# Patient Record
Sex: FEMALE | Race: WHITE | NOT HISPANIC OR LATINO | Employment: OTHER | URBAN - METROPOLITAN AREA
[De-identification: names, ages, dates, MRNs, and addresses within clinical notes are randomized per-mention and may not be internally consistent; named-entity substitution may affect disease eponyms.]

---

## 2024-09-23 ENCOUNTER — OFFICE VISIT (OUTPATIENT)
Dept: GASTROENTEROLOGY | Facility: CLINIC | Age: 69
End: 2024-09-23
Payer: MEDICARE

## 2024-09-23 ENCOUNTER — LAB (OUTPATIENT)
Dept: LAB | Facility: LAB | Age: 69
End: 2024-09-23
Payer: MEDICARE

## 2024-09-23 VITALS
HEIGHT: 66 IN | TEMPERATURE: 97.4 F | WEIGHT: 190 LBS | DIASTOLIC BLOOD PRESSURE: 87 MMHG | HEART RATE: 76 BPM | SYSTOLIC BLOOD PRESSURE: 133 MMHG | BODY MASS INDEX: 30.53 KG/M2

## 2024-09-23 DIAGNOSIS — K21.9 GASTROESOPHAGEAL REFLUX DISEASE, UNSPECIFIED WHETHER ESOPHAGITIS PRESENT: ICD-10-CM

## 2024-09-23 DIAGNOSIS — Z11.59 NEED FOR HEPATITIS C SCREENING TEST: ICD-10-CM

## 2024-09-23 DIAGNOSIS — Z00.00 HEALTHCARE MAINTENANCE: Primary | ICD-10-CM

## 2024-09-23 DIAGNOSIS — R93.5 ABNORMAL CT OF THE ABDOMEN: ICD-10-CM

## 2024-09-23 DIAGNOSIS — K29.70 GASTRITIS WITHOUT BLEEDING, UNSPECIFIED CHRONICITY, UNSPECIFIED GASTRITIS TYPE: ICD-10-CM

## 2024-09-23 DIAGNOSIS — Z00.00 HEALTHCARE MAINTENANCE: ICD-10-CM

## 2024-09-23 DIAGNOSIS — Z12.11 COLON CANCER SCREENING: ICD-10-CM

## 2024-09-23 LAB
ALBUMIN SERPL BCP-MCNC: 4.4 G/DL (ref 3.4–5)
ALP SERPL-CCNC: 97 U/L (ref 33–136)
ALT SERPL W P-5'-P-CCNC: 22 U/L (ref 7–45)
ANION GAP SERPL CALC-SCNC: 14 MMOL/L (ref 10–20)
AST SERPL W P-5'-P-CCNC: 20 U/L (ref 9–39)
BILIRUB SERPL-MCNC: 0.6 MG/DL (ref 0–1.2)
BUN SERPL-MCNC: 11 MG/DL (ref 6–23)
CALCIUM SERPL-MCNC: 9.7 MG/DL (ref 8.6–10.6)
CHLORIDE SERPL-SCNC: 103 MMOL/L (ref 98–107)
CO2 SERPL-SCNC: 31 MMOL/L (ref 21–32)
CREAT SERPL-MCNC: 0.62 MG/DL (ref 0.5–1.05)
EGFRCR SERPLBLD CKD-EPI 2021: >90 ML/MIN/1.73M*2
ERYTHROCYTE [DISTWIDTH] IN BLOOD BY AUTOMATED COUNT: 15.4 % (ref 11.5–14.5)
GLUCOSE SERPL-MCNC: 87 MG/DL (ref 74–99)
HCT VFR BLD AUTO: 42.1 % (ref 36–46)
HCV AB SER QL: NONREACTIVE
HGB BLD-MCNC: 12.8 G/DL (ref 12–16)
MCH RBC QN AUTO: 26.2 PG (ref 26–34)
MCHC RBC AUTO-ENTMCNC: 30.4 G/DL (ref 32–36)
MCV RBC AUTO: 86 FL (ref 80–100)
NRBC BLD-RTO: 0 /100 WBCS (ref 0–0)
PLATELET # BLD AUTO: 212 X10*3/UL (ref 150–450)
POTASSIUM SERPL-SCNC: 4.8 MMOL/L (ref 3.5–5.3)
PROT SERPL-MCNC: 7.1 G/DL (ref 6.4–8.2)
RBC # BLD AUTO: 4.89 X10*6/UL (ref 4–5.2)
SODIUM SERPL-SCNC: 143 MMOL/L (ref 136–145)
WBC # BLD AUTO: 6.3 X10*3/UL (ref 4.4–11.3)

## 2024-09-23 PROCEDURE — 86803 HEPATITIS C AB TEST: CPT

## 2024-09-23 PROCEDURE — 1159F MED LIST DOCD IN RCRD: CPT | Performed by: STUDENT IN AN ORGANIZED HEALTH CARE EDUCATION/TRAINING PROGRAM

## 2024-09-23 PROCEDURE — 99214 OFFICE O/P EST MOD 30 MIN: CPT | Performed by: STUDENT IN AN ORGANIZED HEALTH CARE EDUCATION/TRAINING PROGRAM

## 2024-09-23 PROCEDURE — 36415 COLL VENOUS BLD VENIPUNCTURE: CPT

## 2024-09-23 PROCEDURE — 85027 COMPLETE CBC AUTOMATED: CPT

## 2024-09-23 PROCEDURE — 3008F BODY MASS INDEX DOCD: CPT | Performed by: STUDENT IN AN ORGANIZED HEALTH CARE EDUCATION/TRAINING PROGRAM

## 2024-09-23 PROCEDURE — 1126F AMNT PAIN NOTED NONE PRSNT: CPT | Performed by: STUDENT IN AN ORGANIZED HEALTH CARE EDUCATION/TRAINING PROGRAM

## 2024-09-23 PROCEDURE — 80053 COMPREHEN METABOLIC PANEL: CPT

## 2024-09-23 PROCEDURE — 99204 OFFICE O/P NEW MOD 45 MIN: CPT | Performed by: STUDENT IN AN ORGANIZED HEALTH CARE EDUCATION/TRAINING PROGRAM

## 2024-09-23 RX ORDER — BISMUTH SUBSALICYLATE 262 MG
1 TABLET,CHEWABLE ORAL DAILY
COMMUNITY

## 2024-09-23 RX ORDER — MULTIVITAMIN/IRON/FOLIC ACID 18MG-0.4MG
1 TABLET ORAL DAILY
COMMUNITY

## 2024-09-23 RX ORDER — FAMOTIDINE 40 MG/1
20 TABLET, FILM COATED ORAL 2 TIMES DAILY
Qty: 30 TABLET | Refills: 2 | Status: SHIPPED | OUTPATIENT
Start: 2024-09-23 | End: 2024-12-22

## 2024-09-23 RX ORDER — BIOTIN 1 MG
TABLET ORAL
COMMUNITY

## 2024-09-23 RX ORDER — ACETAMINOPHEN 500 MG
TABLET ORAL
COMMUNITY

## 2024-09-23 RX ORDER — ZINC GLUCONATE 50 MG
TABLET ORAL
COMMUNITY

## 2024-09-23 ASSESSMENT — PAIN SCALES - GENERAL: PAINLEVEL: 0-NO PAIN

## 2024-09-23 NOTE — PROGRESS NOTES
REASON FOR VISIT:  abd pain/reflux    HPI:  Harpal Crespo is a 68 y.o. female with a pmhx of asthma, rectocele who presents for reflux/abd pain.     Today, she endorses GERD for years, however seems to be getting worse with accompanying epigastric pain. Occurs with all meals. Food triggers include sugary foods, carbonated beverages, tomato-based foods.   +recent weight gain which has also worsened symptoms. GERD occurs every day, no noctural sx. Sleeps elevated especially if she has eaten late. No regurgitation. Takes gaviscon with meals. Previously took Prilosec for years, however was told by her physician to taper off of it 15 yeas ago. Does not want to be on ppi. No dysphagia. No unintentional weight loss. No n/v. No melena or hematochezia. Bms occur daily, takes fiber daily.     No labs since 2021 however gets care in [place] as well as she lives there part-time.      Imaging:   CT 7/2021: 1. Asymmetric wall thickening within the gastric cardia may simply relate to contraction of the stomach. However, underlying mucosal lesion not excluded and follow-up endoscopy is recommended.  2. Small sliding hiatal hernia with mild wall thickening distal esophagus suggesting underlying esophagitis.  3. Uncomplicated descending and sigmoid diverticulosis.  4. There is a cyst in midpole left kidney posteriorly.    No fhx of CRC, esophageal, or gastric cancer. Mother with pancreatic cancer age 86. No tobacco use, rare alcohol use.     No PCP.     Prev endoscopic eval:   EGD/Colonoscopy in [place] 10 years ago: per pt, possibly had one polyp, hiatal hernia, hemorrhoids. Indication was reflux.     REVIEW OF SYSTEMS  CONSTITUTIONAL: Negative for fevers  HEENT: Negative for frequent/significant headaches  RESPIRATORY: Negative for hemoptysis  CARDIOVASCULAR: Negative for chest pain  GI: See HPI  : Negative for hematuria  MUSCULOSKELETAL: Negative for arthralgia or myalgia  INTEGUMENTARY/SKIN: Negative for rash or skin  "lesion  HEMATOLOGY/LYMPHOLOGY: Negative for prolonged bleeding  ENDOCRINE: Negative for cold/heat intolerance  NEURO: Negative for encephalopathy  PSYCH: Negative for new changes in mood or affect        Allergies   Allergen Reactions    Peanut Anaphylaxis, Nausea/vomiting, Rash, Shortness of breath and Wheezing    Tree Nuts Anaphylaxis and Nausea/vomiting    Chocolate Nausea/vomiting and GI Upset    Codeine Dizziness, Nausea/vomiting and Wheezing    Doxycycline Other     Back pain, acid reflux    Iodinated Contrast Media Unknown       Past Medical History:   Diagnosis Date    Personal history of other diseases of the digestive system     History of esophageal reflux    Personal history of other diseases of the nervous system and sense organs     History of retinal detachment    Personal history of other diseases of the respiratory system     Personal history of asthma       Past Surgical History:   Procedure Laterality Date    RETINAL DETACHMENT SURGERY  10/21/2013    Repair Of Retinal Detachment    TONSILLECTOMY  10/21/2013    Tonsillectomy       No family history on file.    Social History     Tobacco Use    Smoking status: Never    Smokeless tobacco: Never   Substance Use Topics    Alcohol use: Not on file       Current Outpatient Medications   Medication Sig Dispense Refill    BACILLUS COAGULANS-INULIN ORAL Take by mouth.      cholecalciferol (Vitamin D-3) 50 mcg (2,000 unit) capsule Take by mouth.      magnesium oxide 500 mg capsule Take by mouth.       No current facility-administered medications for this visit.       PHYSICAL EXAM:  /87   Pulse 76   Temp 36.3 °C (97.4 °F)   Ht 1.676 m (5' 6\")   Wt 86.2 kg (190 lb)   BMI 30.67 kg/m²    Gen: aaox3, NAD  Head: Normocephalic, atraumatic  Eyes: Sclera anicteric, conjunctiva pink   Neck: Supple  Heart: RRR, no murmurs, rubs or gallops  Lungs: CTAB, non-labored breathing  Abd: Soft, non-distended, non-tender, bowel sounds present, no rebound or guarding, " no organomegaly  Ext: No LE edema b/l  Neuro: no gross focal deficits  Psych: Congruent mood and affect, appropriate insight and judgement  Skin: No jaundice    Lab Results   Component Value Date    ALT 16 06/29/2021    AST 18 06/29/2021    ALKPHOS 124 06/29/2021    BILITOT 0.4 06/29/2021       === 07/09/21 ===    CT ABDOMEN PELVIS WO IV CONTRAST    - Impression -  1. Asymmetric wall thickening within the gastric cardia may simply relate to  contraction of the stomach. However, underlying mucosal lesion not excluded  and follow-up endoscopy is recommended.  2. Small sliding hiatal hernia with mild wall thickening distal esophagus  suggesting underlying esophagitis.  3. Uncomplicated descending and sigmoid diverticulosis.  4. There is a cyst in midpole left kidney posteriorly.    A Yellow message is being communicated to GURMEET MUÑOZ or Clinical Team via  the [email or web address] system on 7/9/2021 4:47 PM,  Message ID [id number].    This report has been produced using speech recognition.  This exam is available in DICOM format to non-affiliated healthcare  facilities on a secure media free searchable basis with prior patient  authorization.  The patient exposure is reported to a radiation dose index  registry.  All CT examinations are performed with one or more of the  following dose reduction techniques: Automated Exposure Control, Adjustment  of mA and/or KV according to patient size, or use of iterative  reconstruction techniques.    Original Interpreting Physician:   MALVIN ASHFORD MD  Original Transcribed by/Date: PSCB   Jul 9 2021  4:47P  Original Electronically Signed by/Date: MALVIN ASHFORD MD Jul 9 2021  4:47P    Addendum Interpreting Physician:  Addendum Transcribed by/Date: NO ADDENDUM  Addendum Electronically Signed by/Date:      ASSESSMENT  GERD  Epigastric pain  Abnl CT 2021  Need for HCV screening    Worsening GERD in the setting of dietary noncompliance and lifestyle changes (weight  gain, late night snacking). Declined ppi, will start H2 blockade. Last EGD over 10 years ago, however CT 3 years ago with assymmetric wall thickening in the stomach and esophagus, not followed up with direct visualization. Plan for EGD, also due for colon screening/surveillance (prior findings unknown)    PLAN  --EGD/colonoscopy  --declined ppi  --start famotidine bid  --cont gaviscon  --antireflux measures (avoid trigger foods, bed elevation, upright after meals, avoid late night snacking, weight loss)  --MAC per pt preference  --CBC/CMP  --HCV screening  --referral placed to establish with PCP    FU 3 months    Signature: Zena Lai MD    Date: 9/23/2024  Time: 11:14 AM

## 2024-09-23 NOTE — PATIENT INSTRUCTIONS
Thank you for seeing us in clinic today!     In summary, please do the following:  We will schedule you for your colonoscopy and upper endoscopy.   2.   Please get your bloodwork at your earliest convenience.   3. Please establish with a primary care MD at your earliest convenience. A referral has been placed for you.   4. Please take pepcid (famotidine) 20 mg twice a day for reflux. You can continue your gaviscon.   You have acid reflux. This is usually caused by inappropriate relaxation of the lower esophageal sphincter (exit point of the swallow pipe where it meets the stomach). Untreated long-standing acid injury can cause damage to the esophagus, ultimately leading to conditions like Madsen's esophagus and cancer of the esophagus.    - Please try to adhere to the following lifestyle habits that can help mitigate acid reflux symptoms:  Avoid “trigger foods”, or food/drink that tend to precipitate acid reflux symptoms. Common offenders include alcohol, fatty/spicy meals, tomato sauce, chocolate, caffeinated beverages such as coffee and tea, carbonated beverages and peppermint.  Elevate the head of your bed to 45 degrees with a foam wedge or 2-3 pillows, especially if symptoms occur at night or early in the morning  Remain upright for at least 3 hours after meals  Avoid late night snacking  Make efforts to reduce overall stress and anxiety, if applicable  OBESE/OVERWEIGHT: Lose weight    We will see you again in 3 months or sooner if needed.   If you have any questions or concerns, please call the office at [phone].

## 2024-10-09 PROBLEM — K64.9 HEMORRHOIDS: Status: ACTIVE | Noted: 2024-10-09

## 2024-10-09 PROBLEM — K44.9 HIATAL HERNIA: Status: ACTIVE | Noted: 2024-10-09

## 2024-10-09 PROBLEM — K21.9 GASTROESOPHAGEAL REFLUX DISEASE: Status: ACTIVE | Noted: 2024-10-09

## 2024-10-09 PROBLEM — N81.6 RECTOCELE: Status: ACTIVE | Noted: 2024-10-09

## 2024-10-09 PROBLEM — J45.909 ASTHMA: Status: ACTIVE | Noted: 2024-10-09

## 2024-10-09 PROBLEM — N81.4 PROLAPSE OF UTERUS: Status: ACTIVE | Noted: 2024-10-09

## 2024-10-09 NOTE — PROGRESS NOTES
Subjective   Patient ID: Harpal Crespo is a 68 y.o. female who presents for No chief complaint on file..    HPI   Pt comes in to establish: has seen AURA Acosta Cook in the remote past.  PMH sig for asthma and reflux for which she sees GI    Past Medical History:   Diagnosis Date    Asthma 10/09/2024    Since childhood      Gastroesophageal reflux disease 10/09/2024    GI    Hemorrhoids 10/09/2024    Hiatal hernia 10/09/2024    GI    Personal history of other diseases of the nervous system and sense organs     History of retinal detachment    Prolapse of uterus 10/09/2024    Rectocele 10/09/2024     Current Outpatient Medications   Medication Sig Dispense Refill    ALOE VERA ORAL Take 20,000 mg by mouth 2 times a day.      b complex 0.4 mg tablet Take 1 tablet by mouth once daily.      BACILLUS COAGULANS-INULIN ORAL Take by mouth.      CALCIUM ORAL Take 1,040 mg by mouth.      cholecalciferol (Vitamin D-3) 50 mcg (2,000 unit) capsule Take by mouth.      chromium picolinate 400 mcg tablet Take by mouth.      COQ10, UBIQUINOL, ORAL Take 100 mg by mouth.      cyanocobalamin, vitamin B-12, (VITAMIN B-12 ORAL) Take 3,000 mg by mouth.      famotidine (Pepcid) 40 mg tablet Take 0.5 tablets (20 mg) by mouth 2 times a day. 30 tablet 2    GUM MASTIC ORAL Take 1,000 mg by mouth 2 times a day.      magnesium oxide 500 mg capsule Take by mouth.      multivitamin tablet Take 1 tablet by mouth once daily.      NON FORMULARY Glucomannon fiber 1,800 mg bid      NON FORMULARY Christine 400mg      OLIVE LEAF EXTRACT ORAL Take 30,000 mg by mouth.      polaprezinc, zinc carnosine, (PEPZINGI ORAL) Take by mouth 2 times a day.      turmeric (CURCUMIN MISC) 2,750 mg.       No current facility-administered medications for this visit.       Review of Systems see hpi    Objective   There were no vitals taken for this visit.    Physical Exam    Assessment/Plan   Assessment & Plan  Mild intermittent asthma without complication ([facility])          Gastroesophageal reflux disease, unspecified whether esophagitis present  F/u with GI for egd/colonoscopy

## 2024-10-26 NOTE — PROGRESS NOTES
Subjective   Patient ID: Harpal Crespo is a 68 y.o. female who presents for No chief complaint on file..    HPI   Pt comes in to establish: has seen AURA Acosta Cook in the remote past.  PMH sig for asthma and reflux for which she sees GI    Past Medical History:   Diagnosis Date    Asthma 10/09/2024    Since childhood      Gastroesophageal reflux disease 10/09/2024    GI    Hemorrhoids 10/09/2024    Hiatal hernia 10/09/2024    GI    Personal history of other diseases of the nervous system and sense organs     History of retinal detachment    Prolapse of uterus 10/09/2024    Rectocele 10/09/2024     Current Outpatient Medications   Medication Sig Dispense Refill    ALOE VERA ORAL Take 20,000 mg by mouth 2 times a day.      b complex 0.4 mg tablet Take 1 tablet by mouth once daily.      BACILLUS COAGULANS-INULIN ORAL Take by mouth.      CALCIUM ORAL Take 1,040 mg by mouth.      cholecalciferol (Vitamin D-3) 50 mcg (2,000 unit) capsule Take by mouth.      chromium picolinate 400 mcg tablet Take by mouth.      COQ10, UBIQUINOL, ORAL Take 100 mg by mouth.      cyanocobalamin, vitamin B-12, (VITAMIN B-12 ORAL) Take 3,000 mg by mouth.      famotidine (Pepcid) 40 mg tablet Take 0.5 tablets (20 mg) by mouth 2 times a day. 30 tablet 2    GUM MASTIC ORAL Take 1,000 mg by mouth 2 times a day.      magnesium oxide 500 mg capsule Take by mouth.      multivitamin tablet Take 1 tablet by mouth once daily.      NON FORMULARY Glucomannon fiber 1,800 mg bid      NON FORMULARY Christine 400mg      OLIVE LEAF EXTRACT ORAL Take 30,000 mg by mouth.      polaprezinc, zinc carnosine, (PEPZINGI ORAL) Take by mouth 2 times a day.      turmeric (CURCUMIN MISC) 2,750 mg.       No current facility-administered medications for this visit.       Review of Systems see hpi    Objective   There were no vitals taken for this visit.    Physical Exam    Diagnoses and all orders for this visit:  Mild intermittent asthma without complication ([facility])  (Primary)  Gastroesophageal reflux disease, unspecified whether esophagitis present  Comments:  f/u with GI for egd/colonoscopy

## 2024-11-08 ENCOUNTER — APPOINTMENT (OUTPATIENT)
Dept: PRIMARY CARE | Facility: CLINIC | Age: 69
End: 2024-11-08
Payer: MEDICARE

## 2024-11-08 DIAGNOSIS — K21.9 GASTROESOPHAGEAL REFLUX DISEASE, UNSPECIFIED WHETHER ESOPHAGITIS PRESENT: ICD-10-CM

## 2024-11-08 DIAGNOSIS — J45.20 MILD INTERMITTENT ASTHMA WITHOUT COMPLICATION (HHS-HCC): Primary | ICD-10-CM

## 2024-11-14 ENCOUNTER — APPOINTMENT (OUTPATIENT)
Dept: PRIMARY CARE | Facility: CLINIC | Age: 69
End: 2024-11-14
Payer: MEDICARE

## 2024-11-14 DIAGNOSIS — J45.20 MILD INTERMITTENT ASTHMA WITHOUT COMPLICATION (HHS-HCC): Primary | ICD-10-CM

## 2024-11-14 DIAGNOSIS — K21.9 GASTROESOPHAGEAL REFLUX DISEASE, UNSPECIFIED WHETHER ESOPHAGITIS PRESENT: ICD-10-CM

## 2024-11-22 ENCOUNTER — APPOINTMENT (OUTPATIENT)
Dept: PRIMARY CARE | Facility: CLINIC | Age: 69
End: 2024-11-22
Payer: MEDICARE

## 2024-12-06 ENCOUNTER — TELEPHONE (OUTPATIENT)
Dept: GASTROENTEROLOGY | Facility: HOSPITAL | Age: 69
End: 2024-12-06
Payer: MEDICARE

## 2024-12-16 DIAGNOSIS — K21.9 GASTROESOPHAGEAL REFLUX DISEASE, UNSPECIFIED WHETHER ESOPHAGITIS PRESENT: ICD-10-CM

## 2024-12-16 RX ORDER — FAMOTIDINE 20 MG/1
20 TABLET, FILM COATED ORAL 2 TIMES DAILY
Qty: 180 TABLET | Refills: 3 | Status: SHIPPED | OUTPATIENT
Start: 2024-12-16 | End: 2025-12-11